# Patient Record
Sex: MALE | HISPANIC OR LATINO | URBAN - METROPOLITAN AREA
[De-identification: names, ages, dates, MRNs, and addresses within clinical notes are randomized per-mention and may not be internally consistent; named-entity substitution may affect disease eponyms.]

---
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
## 2024-08-07 NOTE — ED PEDIATRIC NURSE NOTE - CCCP TRG CHIEF CMPLNT
Per mother, pt has had sore throat, watery stool, fever at home but has not checked temperature. Fussy. Started yesterday. Given motrin at approx 10am and 7pm.    eye discharge